# Patient Record
Sex: FEMALE | Race: WHITE
[De-identification: names, ages, dates, MRNs, and addresses within clinical notes are randomized per-mention and may not be internally consistent; named-entity substitution may affect disease eponyms.]

---

## 2019-02-14 ENCOUNTER — HOSPITAL ENCOUNTER (EMERGENCY)
Dept: HOSPITAL 31 - C.ER | Age: 24
Discharge: HOME | End: 2019-02-14
Payer: COMMERCIAL

## 2019-02-14 VITALS
DIASTOLIC BLOOD PRESSURE: 76 MMHG | SYSTOLIC BLOOD PRESSURE: 113 MMHG | HEART RATE: 87 BPM | RESPIRATION RATE: 20 BRPM | OXYGEN SATURATION: 96 % | TEMPERATURE: 99.3 F

## 2019-02-14 DIAGNOSIS — K05.10: Primary | ICD-10-CM

## 2019-02-14 NOTE — C.PDOC
History Of Present Illness


24 y/o female presents to the ER complaining of bumps to gums which has been 

present over the past 2 days. Patient states that she was sick with fever and 

cough last week. Patient reports that these symptoms have resolved. She notes 

that she been placed on Augmentin for "infection" by her PMD. Denies having 

fever,chills, sore throat, CP, and SOB.


Time Seen by Provider: 02/14/19 12:12


Chief Complaint (Nursing): ENT Problem


History Per: Patient


History/Exam Limitations: None


Onset/Duration Of Symptoms: Days


Current Symptoms Are (Timing): Still Present


Severity: Moderate





Past Medical History


Reviewed: Historical Data, Nursing Documentation, Vital Signs


Vital Signs: 





                                Last Vital Signs











Temp  99.3 F   02/14/19 11:42


 


Pulse  87   02/14/19 11:42


 


Resp  20   02/14/19 11:42


 


BP  113/76   02/14/19 11:42


 


Pulse Ox  96   02/14/19 11:42














- Medical History


PMH: No Chronic Diseases


Surgical History: No Surg Hx


Family History: States: No Known Family Hx





- Social History


Hx Tobacco Use: No


Hx Alcohol Use: No


Hx Substance Use: No





- Immunization History


Hx Tetanus Toxoid Vaccination: No


Hx Influenza Vaccination: Yes (01/2019)


Hx Pneumococcal Vaccination: No





Review Of Systems


Except As Marked, All Systems Reviewed And Found Negative.


Constitutional: Negative for: Fever, Chills


ENT: Positive for: Other (bumps to gums).  Negative for: Throat Pain


Cardiovascular: Negative for: Chest Pain


Respiratory: Negative for: Shortness of Breath





Physical Exam





- Physical Exam


Appears: Non-toxic, No Acute Distress


Skin: Normal Color, Warm, Dry


Head: Atraumatic, Normacephalic


Eye(s): bilateral: Normal Inspection


Nose: Normal


Oral Mucosa: Moist, Other (scattered pinpoint vesicular lesions to upper and 

lower right buccal mucosa)


Tongue: Normal Appearing, No Swelling


Lips: Normal Appearing, No Swelling


Gingiva: Other (scattered pinpoint vesicular lesions to gums)


Throat: Normal, No Erythema, No Exudate


Neck: Supple


Chest: Symmetrical


Cardiovascular: Rhythm Regular


Respiratory: Normal Breath Sounds, No Rales, No Rhonchi, No Wheezing


Gastrointestinal/Abdominal: Normal Exam, Soft, No Tenderness, No Guarding, No 

Rebound


Neurological/Psych: Oriented x3, Normal Speech





ED Course And Treatment


O2 Sat by Pulse Oximetry: 96 (RA)


Pulse Ox Interpretation: Normal





Medical Decision Making


Medical Decision Making: 


The patient is comfortable with patent airways. 





Disposition





- Disposition


Referrals: 


Behin,Babak, MD [Staff Provider] - 


Disposition: HOME/ ROUTINE


Disposition Time: 12:35


Condition: GOOD


Additional Instructions: 


Follow up with the medical doctor within 1-2 days. Return if worsened.


Prescriptions: 


Mag&Al/Simet/Diphen/Lido [First Magic Mouthwash] 5 ml MM BID #1 kit


predniSONE [Prednisone] 10 mg PO BID #10 tab


Instructions:  Gingivostomatitis, Child (DC)


Forms:  [facility] (English)





- Clinical Impression


Clinical Impression: 


 Gingivostomatitis








- PA / NP / Resident Statement


MD/DO has reviewed & agrees with the documentation as recorded.





- Scribe Statement


The provider has reviewed the documentation as recorded by the Scribe


Christian Castellon





Provider Attestation





All medical record entries made by the Scribe were at my direction and 

personally dictated by me. I have reviewed the chart and agree that the record 

accurately reflects my personal performance of the history, physical exam, 

medical decision making, and the department course for this patient. I have also

 personally directed, reviewed, and agree with the discharge instructions and 

disposition.